# Patient Record
Sex: FEMALE | Race: WHITE | URBAN - METROPOLITAN AREA
[De-identification: names, ages, dates, MRNs, and addresses within clinical notes are randomized per-mention and may not be internally consistent; named-entity substitution may affect disease eponyms.]

---

## 2017-07-08 DIAGNOSIS — Z79.899 LONG-TERM USE OF PLAQUENIL: Primary | ICD-10-CM

## 2017-07-25 ENCOUNTER — OFFICE VISIT (OUTPATIENT)
Dept: OPHTHALMOLOGY | Facility: CLINIC | Age: 67
End: 2017-07-25
Attending: OPHTHALMOLOGY
Payer: MEDICARE

## 2017-07-25 DIAGNOSIS — Z79.899 LONG-TERM USE OF PLAQUENIL: ICD-10-CM

## 2017-07-25 PROCEDURE — 92134 CPTRZ OPH DX IMG PST SGM RTA: CPT | Mod: ZF | Performed by: OPHTHALMOLOGY

## 2017-07-25 PROCEDURE — 92015 DETERMINE REFRACTIVE STATE: CPT | Mod: GY,ZF

## 2017-07-25 PROCEDURE — 99213 OFFICE O/P EST LOW 20 MIN: CPT | Mod: ZF

## 2017-07-25 PROCEDURE — 92083 EXTENDED VISUAL FIELD XM: CPT | Mod: ZF | Performed by: OPHTHALMOLOGY

## 2017-07-25 ASSESSMENT — CONF VISUAL FIELD
OD_NORMAL: 1
OS_NORMAL: 1

## 2017-07-25 ASSESSMENT — TONOMETRY
OS_IOP_MMHG: 14
IOP_METHOD: TONOPEN
OD_IOP_MMHG: 13

## 2017-07-25 ASSESSMENT — REFRACTION_MANIFEST
OS_ADD: +2.50
OS_AXIS: 090
OS_CYLINDER: +1.25
OS_SPHERE: -2.25
OD_SPHERE: -3.00
OD_CYLINDER: +0.25
OD_AXIS: 095
OD_ADD: +2.50

## 2017-07-25 ASSESSMENT — VISUAL ACUITY
OD_PH_CC: 20/40
OD_CC: 20/150
OS_CC: 20/25
METHOD: SNELLEN - LINEAR
CORRECTION_TYPE: GLASSES
OS_CC+: -1

## 2017-07-25 ASSESSMENT — REFRACTION_WEARINGRX
OD_AXIS: 081
OD_CYLINDER: +1.00
SPECS_TYPE: PAL
OS_AXIS: 090
OS_CYLINDER: +1.25
OS_ADD: +2.50
OD_ADD: +2.50
OD_SPHERE: -2.75
OS_SPHERE: -1.75

## 2017-07-25 ASSESSMENT — CUP TO DISC RATIO
OS_RATIO: 0.2
OD_RATIO: 0.2

## 2017-07-25 ASSESSMENT — SLIT LAMP EXAM - LIDS
COMMENTS: INFERIOR SCLERAL SHOW
COMMENTS: INFERIOR SCLERAL SHOW

## 2017-07-25 ASSESSMENT — EXTERNAL EXAM - LEFT EYE: OS_EXAM: NORMAL

## 2017-07-25 ASSESSMENT — EXTERNAL EXAM - RIGHT EYE: OD_EXAM: NORMAL

## 2017-07-25 NOTE — NURSING NOTE
Chief Complaints and History of Present Illnesses   Patient presents with     Follow Up For     Long-term use of Plaquenil     HPI    Affected eye(s):  Both   Symptoms:     Blurred vision   No floaters   No flashes         Do you have eye pain now?:  No      Comments:  Annual follow up for Long-term use of Plaquenil.  The patient notes she may have distance vision changes.  ASHLEY Powell 10:35 AM 07/25/2017

## 2017-07-25 NOTE — MR AVS SNAPSHOT
After Visit Summary   2017    Viviana Lebron    MRN: [id number]           Patient Information     Date Of Birth          1950        Visit Information        Provider Department      2017 10:30 AM [facility]        Today's Diagnoses     Long-term use of Plaquenil           Follow-ups after your visit        Future tests that were ordered for you today     Open Future Orders        Priority Expected Expires Ordered    Color Vision - Screening OU (both eyes) Routine  2019    DILATED FUNDUS EXAM Routine  2019    IOP Measurement Routine  2019    OCT Retina Spectralis OU (both eyes) Routine  2019    Macula Top OU Routine  2019            Who to contact     Please call your clinic at [phone] to:    Ask questions about your health    Make or cancel appointments    Discuss your medicines    Learn about your test results    Speak to your doctor   If you have compliments or concerns about an experience at your clinic, or if you wish to file a complaint, please contact [facility] Patient Relations at [phone] or email us at [email or web address]         Additional Information About Your Visit        MyChart Information     [employer] is an electronic gateway that provides easy, online access to your medical records. With [employer], you can request a clinic appointment, read your test results, renew a prescription or communicate with your care team.     To sign up for [employer] visit the website at [email or web address]   You will be asked to enter the access code listed below, as well as some personal information. Please follow the directions to create your username and password.     Your access code is: [email or web address]  Expires: 10/9/2017  6:31 AM     Your access code will  in 90 days. If you need help or a new code, please contact your [facility]  [place] [facility] or call [phone] for assistance.        Care EveryWhere ID     This is your Care EveryWhere ID. This could be used by other organizations to access your [place] medical records  [id number]         Blood Pressure from Last 3 Encounters:   No data found for BP    Weight from Last 3 Encounters:   No data found for Wt              We Performed the Following     DILATED FUNDUS EXAM     IOP Measurement     Macula Top OU     OCT Retina Spectralis OU (both eyes)          Today's Medication Changes          These changes are accurate as of: 7/25/17  1:35 PM.  If you have any questions, ask your nurse or doctor.               Stop taking these medicines if you haven't already. Please contact your care team if you have questions.     cefdinir 300 MG capsule   Commonly known as:  OMNICEF   Stopped by:  Steve Guillaume MD                    Primary Care Provider Office Phone # Fax #    Mellissa Raymundo [phone]       [facility] [address]  [facility] [address]        Equal Access to Services     RAFAEL DAILEY AH: Hadii aad ku hadasho Soomaali, waaxda luqadaha, qaybta kaalmada adeegyada, waxay idiin hayaan nicoleeg kharash daniel . So [facility] [phone].    ATENCIÓN: Si habla español, tiene a garibay disposición servicios gratuitos de asistencia lingüística. Llame al [phone].    We comply with applicable federal civil rights laws and [place] laws. We do not discriminate on the basis of race, color, national origin, age, disability sex, sexual orientation or gender identity.            Thank you!     Thank you for choosing [facility]  for your care. Our goal is always to provide you with excellent care. Hearing back from our patients is one way we can continue to improve our services. Please take a few minutes to complete the written survey that you may receive in the mail after your visit with us. Thank you!             Your Updated Medication List - Protect others  around you: Learn how to safely use, store and throw away your medicines at www.disposemymeds.org.          This list is accurate as of: 7/25/17  1:35 PM.  Always use your most recent med list.                   Brand Name Dispense Instructions for use Diagnosis    abatacept 250 MG injection    ORENCIA     Inject 1,000 mg into the vein once Every 4 weeks        ALBUTEROL INHALER 17GM      Inhale 2 puffs into the lungs as needed        allopurinol 300 MG tablet    ZYLOPRIM     Take 300 mg by mouth daily        CALCIUM + D PO           fluticasone 50 MCG/ACT spray    FLONASE     Spray 2 sprays into both nostrils as needed        hydroxychloroquine 200 MG tablet    PLAQUENIL     Take by mouth daily 1 1/2 tabs daily        HYZAAR 100-25 MG per tablet   Generic drug:  losartan-hydrochlorothiazide      Take 1 tablet by mouth daily        MULTIVITAMIN PO           omeprazole 20 MG tablet      Take 20 mg by mouth daily        simvastatin 20 MG tablet    ZOCOR     Take 1 tablet by mouth At Bedtime        sulfaSALAzine 500 MG tablet    AZULFIDINE     Take 2 tablets by mouth 2 times daily        VITAMIN D3 PO      Take 1,000 Units by mouth daily

## 2017-07-25 NOTE — PROGRESS NOTES
Assessment & Plan     Viviana Lebron is a 67 year old female with the following diagnoses:   1. Long-term use of Plaquenil       No evidence of plaquenil toxicity.  She has had a cumulative dose of 1533 grams of Plaquenil for rheumatoid arthritis currently on 5.4 mg/kg dose ( 300 mg / day). OCT of the macula, FAF and 10-2 TOP visual field testing all have been stable since the last visit. Of note, that she has index myopia due to senile cataract of the right eye. She can definitely benefit from cataract extraction in the right eye. Follow up in one year.          Attending Physician Attestation:  Complete documentation of historical and exam elements from today's encounter can be found in the full encounter summary report (not reduplicated in this progress note).  I personally obtained the chief complaint(s) and history of present illness.  I confirmed and edited as necessary the review of systems, past medical/surgical history, family history, social history, and examination findings as documented by others; and I examined the patient myself.  I personally reviewed the relevant tests, images, and reports as documented above.  I formulated and edited as necessary the assessment and plan and discussed the findings and management plan with the patient and family. - Steve Guillaume MD

## 2017-07-25 NOTE — LETTER
2017         RE:  :  MRN: Viviana Lebron  1950  [id number]     Dear Dr. Lucas Muniz,    Your patient, Viviana Lebron, returned for neuro-ophthalmic follow up. My assessment and plan are below.  For further details, please see my attached clinic note.          Assessment & Plan     Viviana Lebron is a 67 year old female with the following diagnoses:   1. Long-term use of Plaquenil       She has had a cumulative dose of 1533 grams of Plaquenil for rheumatoid arthritis currently on 5.4 mg/kg dose ( 300 mg / day). OCT of the macula, FAF and 10-2 TOP visual field testing all have been stable since the last visit. Of note, that she has index myopia due to senile cataract of the right eye. She can definitely benefit from cataract extraction in the right eye. Follow up in one year.         Again, thank you for allowing me to participate in the care of your patient.      Sincerely,    Steve Guillaume MD  Professor, Neuro-Ophthalmology  Department of Ophthalmology and Visual Neurosciences  [facility]      CC: Lucas Muniz MD  [facility] [place]  [address] [place] [address]  VIA Facsimile: [phone]     [facility]  [address]  [place] [address]  VIA Facsimile: [phone]     Ara Pearson  [facility]  [address] [place] [address]  VIA Facsimile: [phone]       DX = ***

## 2017-07-25 NOTE — LETTER
2017         RE:  :  MRN: Viviana Lebron  1950  [id number]     Dear Dr. Muniz,    Your patient, Viviana Lebron, returned for neuro-ophthalmic follow up. My assessment and plan are below.  For further details, please see my attached clinic note.      Assessment & Plan     Viviana Lebron is a 67 year old female with the following diagnoses:   1. Long-term use of Plaquenil       No evidence of plaquenil toxicity.  She has had a cumulative dose of 1533 grams of Plaquenil for rheumatoid arthritis currently on 5.4 mg/kg dose ( 300 mg / day). OCT of the macula, FAF and 10-2 TOP visual field testing all have been stable since the last visit. Of note, that she has index myopia due to senile cataract of the right eye. She can definitely benefit from cataract extraction in the right eye. Follow up in one year.     Again, thank you for allowing me to participate in the care of your patient.      Sincerely,    Steve Guillaume MD  Professor, Neuro-Ophthalmology  Department of Ophthalmology and Visual Neurosciences  [facility]        CC: Lucas Muniz MD  [facility] [place]  [address] [place] [address]  VIA Facsimile: [phone]     [facility]  [address]  [place] [address]  VIA Facsimile: [phone]     [facility] Brun[facility]  [address] [place] [address]  VIA Facsimile: [phone]

## 2018-04-10 ENCOUNTER — TRANSFERRED RECORDS (OUTPATIENT)
Dept: HEALTH INFORMATION MANAGEMENT | Facility: CLINIC | Age: 68
End: 2018-04-10